# Patient Record
Sex: MALE | Race: ASIAN | NOT HISPANIC OR LATINO | Employment: FULL TIME | URBAN - METROPOLITAN AREA
[De-identification: names, ages, dates, MRNs, and addresses within clinical notes are randomized per-mention and may not be internally consistent; named-entity substitution may affect disease eponyms.]

---

## 2020-10-12 ENCOUNTER — NON-PROVIDER VISIT (OUTPATIENT)
Dept: OCCUPATIONAL MEDICINE | Facility: CLINIC | Age: 50
End: 2020-10-12
Payer: COMMERCIAL

## 2020-10-12 ENCOUNTER — EH NON-PROVIDER (OUTPATIENT)
Dept: OCCUPATIONAL MEDICINE | Facility: CLINIC | Age: 50
End: 2020-10-12
Payer: COMMERCIAL

## 2020-10-12 DIAGNOSIS — Z02.89 ENCOUNTER FOR OCCUPATIONAL HEALTH EXAMINATION INVOLVING RESPIRATOR: ICD-10-CM

## 2020-10-12 PROCEDURE — 94375 RESPIRATORY FLOW VOLUME LOOP: CPT | Performed by: PREVENTIVE MEDICINE

## 2020-11-09 ENCOUNTER — HOSPITAL ENCOUNTER (OUTPATIENT)
Dept: LAB | Facility: MEDICAL CENTER | Age: 50
End: 2020-11-09
Attending: EMERGENCY MEDICINE
Payer: COMMERCIAL

## 2020-11-09 LAB
COVID ORDER STATUS COVID19: NORMAL
SARS-COV-2 RNA RESP QL NAA+PROBE: NOTDETECTED
SPECIMEN SOURCE: NORMAL

## 2020-11-09 PROCEDURE — U0003 INFECTIOUS AGENT DETECTION BY NUCLEIC ACID (DNA OR RNA); SEVERE ACUTE RESPIRATORY SYNDROME CORONAVIRUS 2 (SARS-COV-2) (CORONAVIRUS DISEASE [COVID-19]), AMPLIFIED PROBE TECHNIQUE, MAKING USE OF HIGH THROUGHPUT TECHNOLOGIES AS DESCRIBED BY CMS-2020-01-R: HCPCS

## 2020-11-10 ENCOUNTER — TELEPHONE (OUTPATIENT)
Dept: OCCUPATIONAL MEDICINE | Facility: CLINIC | Age: 50
End: 2020-11-10

## 2020-11-10 NOTE — TELEPHONE ENCOUNTER
Please notify patient of negative test for COVID-19; follow up with Infection Prevention as directed.

## 2020-11-13 ENCOUNTER — HOSPITAL ENCOUNTER (OUTPATIENT)
Facility: MEDICAL CENTER | Age: 50
End: 2020-11-13
Attending: PHYSICIAN ASSISTANT
Payer: COMMERCIAL

## 2020-11-13 ENCOUNTER — OFFICE VISIT (OUTPATIENT)
Dept: URGENT CARE | Facility: CLINIC | Age: 50
End: 2020-11-13
Payer: COMMERCIAL

## 2020-11-13 ENCOUNTER — TELEMEDICINE (OUTPATIENT)
Dept: TELEHEALTH | Facility: TELEMEDICINE | Age: 50
End: 2020-11-13
Payer: COMMERCIAL

## 2020-11-13 VITALS
TEMPERATURE: 97.4 F | HEART RATE: 99 BPM | DIASTOLIC BLOOD PRESSURE: 76 MMHG | HEIGHT: 71 IN | RESPIRATION RATE: 16 BRPM | SYSTOLIC BLOOD PRESSURE: 110 MMHG | OXYGEN SATURATION: 96 % | WEIGHT: 209 LBS | BODY MASS INDEX: 29.26 KG/M2

## 2020-11-13 DIAGNOSIS — R50.9 FEVER, UNSPECIFIED FEVER CAUSE: ICD-10-CM

## 2020-11-13 DIAGNOSIS — R53.83 FATIGUE, UNSPECIFIED TYPE: ICD-10-CM

## 2020-11-13 LAB
ALBUMIN SERPL BCP-MCNC: 3.9 G/DL (ref 3.2–4.9)
ALBUMIN/GLOB SERPL: 1.3 G/DL
ALP SERPL-CCNC: 59 U/L (ref 30–99)
ALT SERPL-CCNC: 21 U/L (ref 2–50)
ANION GAP SERPL CALC-SCNC: 12 MMOL/L (ref 7–16)
APPEARANCE UR: CLEAR
AST SERPL-CCNC: 29 U/L (ref 12–45)
BASOPHILS # BLD AUTO: 0.4 % (ref 0–1.8)
BASOPHILS # BLD: 0.01 K/UL (ref 0–0.12)
BILIRUB SERPL-MCNC: 0.4 MG/DL (ref 0.1–1.5)
BILIRUB UR STRIP-MCNC: NORMAL MG/DL
BUN SERPL-MCNC: 14 MG/DL (ref 8–22)
CALCIUM SERPL-MCNC: 9 MG/DL (ref 8.5–10.5)
CHLORIDE SERPL-SCNC: 98 MMOL/L (ref 96–112)
CO2 SERPL-SCNC: 26 MMOL/L (ref 20–33)
COLOR UR AUTO: YELLOW
CREAT SERPL-MCNC: 0.9 MG/DL (ref 0.5–1.4)
EOSINOPHIL # BLD AUTO: 0.04 K/UL (ref 0–0.51)
EOSINOPHIL NFR BLD: 1.6 % (ref 0–6.9)
ERYTHROCYTE [DISTWIDTH] IN BLOOD BY AUTOMATED COUNT: 40.4 FL (ref 35.9–50)
FLUAV+FLUBV AG SPEC QL IA: NORMAL
GLOBULIN SER CALC-MCNC: 3.1 G/DL (ref 1.9–3.5)
GLUCOSE BLD-MCNC: 99 MG/DL (ref 70–100)
GLUCOSE SERPL-MCNC: 92 MG/DL (ref 65–99)
GLUCOSE UR STRIP.AUTO-MCNC: 1000 MG/DL
HCT VFR BLD AUTO: 49.8 % (ref 42–52)
HETEROPH AB SER QL LA: NORMAL
HGB BLD-MCNC: 16.7 G/DL (ref 14–18)
IMM GRANULOCYTES # BLD AUTO: 0.01 K/UL (ref 0–0.11)
IMM GRANULOCYTES NFR BLD AUTO: 0.4 % (ref 0–0.9)
INT CON NEG: NORMAL
INT CON POS: NORMAL
KETONES UR STRIP.AUTO-MCNC: 40 MG/DL
LEUKOCYTE ESTERASE UR QL STRIP.AUTO: NORMAL
LYMPHOCYTES # BLD AUTO: 0.67 K/UL (ref 1–4.8)
LYMPHOCYTES NFR BLD: 26.1 % (ref 22–41)
MCH RBC QN AUTO: 29.2 PG (ref 27–33)
MCHC RBC AUTO-ENTMCNC: 33.5 G/DL (ref 33.7–35.3)
MCV RBC AUTO: 87.2 FL (ref 81.4–97.8)
MONOCYTES # BLD AUTO: 0.32 K/UL (ref 0–0.85)
MONOCYTES NFR BLD AUTO: 12.5 % (ref 0–13.4)
NEUTROPHILS # BLD AUTO: 1.52 K/UL (ref 1.82–7.42)
NEUTROPHILS NFR BLD: 59 % (ref 44–72)
NITRITE UR QL STRIP.AUTO: NORMAL
NRBC # BLD AUTO: 0 K/UL
NRBC BLD-RTO: 0 /100 WBC
PH UR STRIP.AUTO: 6 [PH] (ref 5–8)
PLATELET # BLD AUTO: 98 K/UL (ref 164–446)
PMV BLD AUTO: 11.7 FL (ref 9–12.9)
POTASSIUM SERPL-SCNC: 4.2 MMOL/L (ref 3.6–5.5)
PROT SERPL-MCNC: 7 G/DL (ref 6–8.2)
PROT UR QL STRIP: NORMAL MG/DL
RBC # BLD AUTO: 5.71 M/UL (ref 4.7–6.1)
RBC UR QL AUTO: NORMAL
S PYO AG THROAT QL: NORMAL
SODIUM SERPL-SCNC: 136 MMOL/L (ref 135–145)
SP GR UR STRIP.AUTO: 1.02
UROBILINOGEN UR STRIP-MCNC: 0.2 MG/DL
WBC # BLD AUTO: 2.6 K/UL (ref 4.8–10.8)

## 2020-11-13 PROCEDURE — 80053 COMPREHEN METABOLIC PANEL: CPT

## 2020-11-13 PROCEDURE — 82962 GLUCOSE BLOOD TEST: CPT | Performed by: PHYSICIAN ASSISTANT

## 2020-11-13 PROCEDURE — 87804 INFLUENZA ASSAY W/OPTIC: CPT | Performed by: PHYSICIAN ASSISTANT

## 2020-11-13 PROCEDURE — 87880 STREP A ASSAY W/OPTIC: CPT | Performed by: PHYSICIAN ASSISTANT

## 2020-11-13 PROCEDURE — 99214 OFFICE O/P EST MOD 30 MIN: CPT | Performed by: PHYSICIAN ASSISTANT

## 2020-11-13 PROCEDURE — 81002 URINALYSIS NONAUTO W/O SCOPE: CPT | Performed by: PHYSICIAN ASSISTANT

## 2020-11-13 PROCEDURE — 85025 COMPLETE CBC W/AUTO DIFF WBC: CPT

## 2020-11-13 PROCEDURE — 99213 OFFICE O/P EST LOW 20 MIN: CPT | Mod: CS,95,CR | Performed by: PHYSICIAN ASSISTANT

## 2020-11-13 PROCEDURE — U0003 INFECTIOUS AGENT DETECTION BY NUCLEIC ACID (DNA OR RNA); SEVERE ACUTE RESPIRATORY SYNDROME CORONAVIRUS 2 (SARS-COV-2) (CORONAVIRUS DISEASE [COVID-19]), AMPLIFIED PROBE TECHNIQUE, MAKING USE OF HIGH THROUGHPUT TECHNOLOGIES AS DESCRIBED BY CMS-2020-01-R: HCPCS

## 2020-11-13 PROCEDURE — 86308 HETEROPHILE ANTIBODY SCREEN: CPT | Performed by: PHYSICIAN ASSISTANT

## 2020-11-13 RX ORDER — INSULIN DEGLUDEC 200 U/ML
INJECTION, SOLUTION SUBCUTANEOUS
COMMUNITY
Start: 2020-11-01

## 2020-11-13 RX ORDER — ATORVASTATIN CALCIUM 20 MG/1
TABLET, FILM COATED ORAL
COMMUNITY
Start: 2020-11-01

## 2020-11-13 RX ORDER — ENALAPRIL MALEATE 2.5 MG/1
TABLET ORAL
COMMUNITY
Start: 2020-11-01

## 2020-11-13 SDOH — HEALTH STABILITY: MENTAL HEALTH: HOW OFTEN DO YOU HAVE A DRINK CONTAINING ALCOHOL?: NEVER

## 2020-11-13 ASSESSMENT — ENCOUNTER SYMPTOMS
CHILLS: 0
WHEEZING: 0
FEVER: 1
FEVER: 1
HEADACHES: 0
COUGH: 0
EYE PAIN: 0
DIAPHORESIS: 1
PALPITATIONS: 0
SHORTNESS OF BREATH: 0
SPUTUM PRODUCTION: 0
WEIGHT LOSS: 0
BLURRED VISION: 0
SHORTNESS OF BREATH: 0
COUGH: 0
MYALGIAS: 0
ABDOMINAL PAIN: 0
VOMITING: 0
DIARRHEA: 0
SORE THROAT: 0
CHILLS: 1
HEADACHES: 0
DIAPHORESIS: 1
DOUBLE VISION: 0
NAUSEA: 0
CONSTIPATION: 0
MYALGIAS: 0
FLANK PAIN: 0

## 2020-11-13 NOTE — PROGRESS NOTES
Telemedicine Visit: Established Patient     This evaluation was conducted via ZOOM using secure and encrypted videoconferencing technology. The patient was in a private location in the state of [place].    The patient's identity was confirmed and verbal consent was obtained for this virtual visit.    Subjective:   CC: fever x 8 days  Crow Mtz is a 50 y.o. male presenting for evaluation and management of 8 days of fevers, tested for Covid on day 4 of symptoms which was negative.  Patient reports diaphoresis, feels better with tylenol/ibuprofen.   Has seen T-max of 100.4.      Review of Systems   Constitutional: Positive for diaphoresis, fever and malaise/fatigue. Negative for chills.   HENT: Negative for congestion, ear pain and sore throat.    Eyes: Negative for pain.   Respiratory: Negative for cough and shortness of breath.    Cardiovascular: Negative for chest pain. Claudication: covid.   Gastrointestinal: Negative for abdominal pain, constipation, diarrhea, nausea and vomiting.   Genitourinary: Negative for dysuria.   Musculoskeletal: Negative for myalgias.   Skin: Negative for rash.   Neurological: Negative for headaches.        Current medicines (including changes today)  Medications:    • This patient does not have an active medication from one of the medication groupers.    Allergies: Patient has no allergy information on record.    Problem List: Crow Mtz does not have a problem list on file.    Surgical History:  No past surgical history on file.    Past Social Hx: Crow Mtz       Past Family Hx:  Crow Mtz family history is not on file.     Problem list, medications, and allergies reviewed by myself today in Epic.     Objective:   There were no vitals taken for this visit. Not taken due to virtual visit.    Physical Exam:  Constitutional: Alert, no distress, well-groomed.  Skin: No rashes in visible areas.  Eye: Round. Conjunctiva clear, lids normal. No icterus.   ENMT: Lips pink  without lesions, good dentition, moist mucous membranes. Phonation normal.  Neck: No masses, no thyromegaly. Moves freely without pain.  CV: Pulse as reported by patient is normal  Respiratory: Unlabored respiratory effort, no cough or audible wheeze  Psych: Alert and oriented x3, normal affect and mood.       Assessment and Plan:   The following treatment plan was discussed:     1. Fever, unspecified fever cause    I had a greater than 20-minute conversation with this patient regarding his symptoms, previous work-up, and possible etiology of his symptoms.  Fundamentally this patient is a type II diabetic on an abundance of hyperglycemia medications presenting with 8 days of a fever, mild fatigue, and diaphoresis.  I discussed with him that it probably warrants an in person evaluation for repeat Covid testing in the case of a false negative, as well as noninfectious causes of fever.  I am concerned about a possible urinary tract infection, normoglycemic diabetic ketoacidosis, atypical pneumonia, versus several different etiologies.  I recommended point-of-care influenza, point-of-care Monospot.  I then called the providers at the local urgent care to briefed them on her visit and provide some background and recommendations.      Follow-up: No follow-ups on file.

## 2020-11-13 NOTE — PROGRESS NOTES
Subjective:   Crow Mtz is a 50 y.o. male who presents today with   Chief Complaint   Patient presents with   • Fever     x8 days        Fever   This is a new problem. Episode onset: 8 days. The problem occurs intermittently. The problem has been waxing and waning. The temperature was taken using an oral thermometer. Associated symptoms include a rash. Pertinent negatives include no chest pain, coughing, headaches, urinary pain or wheezing. He has tried acetaminophen and NSAIDs for the symptoms. The treatment provided moderate relief.   Temp up to 100.4 F  COVID negative after being tested on day 4 of symptoms which was 11/9/2020. 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   Patient was seen at virtual visit this morning and told to come in for in person evaluation.  Patient is type II diabetic on multiple agents to control.  He states his blood sugar after having a small snack this morning was at 111 when he was seen virtually. Infection Prevention told patient to have repeat COVID test potentially if no other findings.  Upon visit patient noticed a new rash after taking his jacket off. This did go down. Patient states A1C was 5.4 before coming here to work about a month ago.  PMH:  has no past medical history on file.  MEDS:   Current Outpatient Medications:   •  atorvastatin (LIPITOR) 20 MG Tab, , Disp: , Rfl:   •  Dulaglutide 1.5 MG/0.5ML Solution Pen-injector, , Disp: , Rfl:   •  Empagliflozin 10 MG Tab, , Disp: , Rfl:   •  enalapril (VASOTEC) 2.5 MG Tab, , Disp: , Rfl:   •  Insulin Degludec (TRESIBA FLEXTOUCH) 200 UNIT/ML Solution Pen-injector, , Disp: , Rfl:   •  metFORMIN (GLUCOPHAGE) 500 MG Tab, , Disp: , Rfl:   ALLERGIES:   Allergies   Allergen Reactions   • Penicillins Unspecified     SURGHX: History reviewed. No pertinent surgical history.  SOCHX:  reports that he has never smoked. He has never used smokeless tobacco. He reports that he does not drink alcohol or use drugs.  FH: Reviewed with patient, not  "pertinent to this visit.     Review of Systems   Constitutional: Positive for chills, diaphoresis, fever and malaise/fatigue. Negative for weight loss.        Loss of appetite    Eyes: Negative for blurred vision and double vision.   Respiratory: Negative for cough, sputum production, shortness of breath and wheezing.    Cardiovascular: Negative for chest pain and palpitations.   Genitourinary: Negative for dysuria, flank pain, frequency, hematuria and urgency.   Musculoskeletal: Negative for myalgias.   Skin: Positive for rash. Negative for itching.   Neurological: Negative for headaches.   All other systems reviewed and are negative.     Objective:   /76   Pulse 99   Temp 36.3 °C (97.4 °F) (Temporal)   Resp 16   Ht 1.803 m (5' 11\")   Wt 94.8 kg (209 lb)   SpO2 96%   BMI 29.15 kg/m²   Physical Exam  Vitals signs and nursing note reviewed.   Constitutional:       General: He is not in acute distress.     Appearance: He is well-developed.   HENT:      Head: Normocephalic and atraumatic.      Right Ear: Hearing normal.      Left Ear: Hearing normal.   Eyes:      Pupils: Pupils are equal, round, and reactive to light.   Cardiovascular:      Rate and Rhythm: Normal rate and regular rhythm.      Heart sounds: Normal heart sounds.   Pulmonary:      Effort: Pulmonary effort is normal. No respiratory distress.      Breath sounds: Normal breath sounds. No stridor. No wheezing, rhonchi or rales.   Abdominal:      Tenderness: There is no abdominal tenderness.   Musculoskeletal:      Comments: Normal movement in all 4 extremities   Skin:     General: Skin is warm and dry.             Comments: Erythematous macular rash to bilateral upper extremities.   Neurological:      Mental Status: He is alert.      Coordination: Coordination normal.       MONO NEG  STREP A NEG  FLU NEG  UA >1000 GLUCOSE, + Ketones  Glucose 99    Assessment/Plan:   Assessment    1. Fever, unspecified fever cause  - POCT Rapid Strep A  - POCT " Influenza A/B  - POCT Mononucleosis (mono)  - POCT Urinalysis  - COVID/SARS CoV-2 PCR; Future    2. Fatigue, unspecified type  - POCT Mononucleosis (mono)  - POCT Glucose  - CBC WITH DIFFERENTIAL; Future  - Comp Metabolic Panel; Future  - COVID/SARS CoV-2 PCR; Future    Other orders  - atorvastatin (LIPITOR) 20 MG Tab  - Dulaglutide 1.5 MG/0.5ML Solution Pen-injector  - Empagliflozin 10 MG Tab  - enalapril (VASOTEC) 2.5 MG Tab  - Insulin Degludec (TRESIBA FLEXTOUCH) 200 UNIT/ML Solution Pen-injector  - metFORMIN (GLUCOPHAGE) 500 MG Tab  Discussed CDC guidelines including self isolation at home.   Patient encouraged to get plenty of rest, use OTC tylenol for pain/fever, and drink plenty of fluids.  Discussed concern for potential normoglycemic DKA.  Encouraged patient to go to the ER for fluid rehydration. Patient is understanding of my recommendation.  He would like to have labs done in clinic today rather than going to the ER and await those results.  Discussed that he should closely monitor his symptoms and drink lots of fluids.  He is agreeable with this plan.  We will repeat Covid test today.  Attempted to call patient with results but he did not answer. Sent message on [employer].  ER if any worsening or persistent symptoms.        Please note that this dictation was created using voice recognition software. I have made every reasonable attempt to correct obvious errors, but I expect that there are errors of grammar and possibly content that I did not discover before finalizing the note.    Yaya Ramirez PA-C

## 2020-11-14 LAB
COVID ORDER STATUS COVID19: NORMAL
SARS-COV-2 RNA RESP QL NAA+PROBE: DETECTED
SPECIMEN SOURCE: ABNORMAL

## 2020-11-17 ENCOUNTER — TELEPHONE (OUTPATIENT)
Dept: URGENT CARE | Facility: CLINIC | Age: 50
End: 2020-11-17

## 2020-11-18 NOTE — TELEPHONE ENCOUNTER
----- Message from Yaya Ramirez P.A.-C. sent at 11/16/2020  3:44 PM PST -----  Please attempt to call patient with results. I have called multiple times with no answer.  Thanks

## 2020-11-18 NOTE — TELEPHONE ENCOUNTER
Attempted to call patient and inform him of his Positive Covid Results, but left a message instead to call us back to go over those.   Urged the patient to call us back as soon as possible.

## 2020-12-20 ENCOUNTER — IMMUNIZATION (OUTPATIENT)
Dept: FAMILY PLANNING/WOMEN'S HEALTH CLINIC | Facility: IMMUNIZATION CENTER | Age: 50
End: 2020-12-20

## 2020-12-20 DIAGNOSIS — Z23 ENCOUNTER FOR VACCINATION: Primary | ICD-10-CM

## 2020-12-20 PROCEDURE — 91300 PFIZER SARS-COV-2 VACCINE: CPT

## 2020-12-20 PROCEDURE — 0001A PFIZER SARS-COV-2 VACCINE: CPT

## 2020-12-30 DIAGNOSIS — Z23 NEED FOR VACCINATION: ICD-10-CM

## 2021-01-10 ENCOUNTER — IMMUNIZATION (OUTPATIENT)
Dept: FAMILY PLANNING/WOMEN'S HEALTH CLINIC | Facility: IMMUNIZATION CENTER | Age: 51
End: 2021-01-10
Attending: FAMILY MEDICINE
Payer: COMMERCIAL

## 2021-01-10 DIAGNOSIS — Z23 NEED FOR VACCINATION: ICD-10-CM

## 2021-01-10 DIAGNOSIS — Z23 ENCOUNTER FOR VACCINATION: Primary | ICD-10-CM

## 2021-01-10 PROCEDURE — 91300 PFIZER SARS-COV-2 VACCINE: CPT

## 2021-01-10 PROCEDURE — 0002A PFIZER SARS-COV-2 VACCINE: CPT
